# Patient Record
Sex: FEMALE | Race: WHITE | Employment: UNEMPLOYED | URBAN - METROPOLITAN AREA
[De-identification: names, ages, dates, MRNs, and addresses within clinical notes are randomized per-mention and may not be internally consistent; named-entity substitution may affect disease eponyms.]

---

## 2022-10-06 ENCOUNTER — APPOINTMENT (OUTPATIENT)
Dept: GENERAL RADIOLOGY | Age: 57
End: 2022-10-06
Payer: COMMERCIAL

## 2022-10-06 ENCOUNTER — HOSPITAL ENCOUNTER (EMERGENCY)
Age: 57
Discharge: HOME | End: 2022-10-06
Attending: EMERGENCY MEDICINE
Payer: COMMERCIAL

## 2022-10-06 VITALS
RESPIRATION RATE: 14 BRPM | HEART RATE: 100 BPM | HEIGHT: 60 IN | DIASTOLIC BLOOD PRESSURE: 96 MMHG | BODY MASS INDEX: 23.75 KG/M2 | SYSTOLIC BLOOD PRESSURE: 144 MMHG | WEIGHT: 121 LBS | OXYGEN SATURATION: 99 % | TEMPERATURE: 98.3 F

## 2022-10-06 DIAGNOSIS — S60.10XA SUBUNGUAL HEMATOMA OF FINGER OF LEFT HAND, INITIAL ENCOUNTER: ICD-10-CM

## 2022-10-06 DIAGNOSIS — S62.663A CLOSED NONDISPLACED FRACTURE OF DISTAL PHALANX OF LEFT MIDDLE FINGER, INITIAL ENCOUNTER: Primary | ICD-10-CM

## 2022-10-06 PROCEDURE — 11740 EVACUATION SUBUNGUAL HMTMA: CPT

## 2022-10-06 PROCEDURE — 73140 X-RAY EXAM OF FINGER(S): CPT

## 2022-10-06 PROCEDURE — 99283 EMERGENCY DEPT VISIT LOW MDM: CPT

## 2022-10-06 RX ORDER — CLINDAMYCIN HYDROCHLORIDE 300 MG/1
300 CAPSULE ORAL 3 TIMES DAILY
Qty: 30 CAPSULE | Refills: 0 | Status: SHIPPED | OUTPATIENT
Start: 2022-10-06 | End: 2022-10-16

## 2022-10-06 ASSESSMENT — PAIN SCALES - GENERAL: PAINLEVEL_OUTOF10: 6

## 2022-10-06 ASSESSMENT — PAIN DESCRIPTION - PAIN TYPE: TYPE: ACUTE PAIN

## 2022-10-06 ASSESSMENT — PAIN DESCRIPTION - ORIENTATION: ORIENTATION: LEFT

## 2022-10-06 ASSESSMENT — PAIN DESCRIPTION - LOCATION: LOCATION: HAND

## 2022-10-06 ASSESSMENT — PAIN - FUNCTIONAL ASSESSMENT: PAIN_FUNCTIONAL_ASSESSMENT: 0-10

## 2022-10-06 NOTE — ED PROVIDER NOTES
[facility] ENCOUNTER      Pt Name: Francisco Javier Byrd  MRN: [id number]  [place] 1965  Date of evaluation: 10/6/2022  Provider: Randy Mullen MD    [address] [place] [address]       Chief Complaint   Patient presents with    Hand Pain     Reports of hitting the left middle finger with a hammer on Saturday          HISTORY OF PRESENT ILLNESS   (Location/Symptom, Timing/Onset, Context/Setting, Quality, Duration, Modifying Factors, Severity)  Note limiting factors. Francisco Javier Byrd is a 64 y.o. female who presents to the emergency department 2 days after smashing her finger with a hammer    HPI    Nursing Notes were reviewed. This is a 51-year-old woman who comes to the emergency department 2 days after smashing her middle finger on her left hand with a hammer. The patient says that she was trying to hammer in a nail and missed and struck her middle finger. She has been having worsening pain and swelling in the finger as well as under the nailbed since that event. She denies any other injury. REVIEW OF SYSTEMS    (2-9 systems for level 4, 10 or more for level 5)     Review of Systems    8 systems reviewed with this patient and all were negative with the exception of those noted in the history of present illness above. PAST MEDICAL HISTORY   History reviewed. No pertinent past medical history. SURGICAL HISTORY     History reviewed. No pertinent surgical history. CURRENT MEDICATIONS       Discharge Medication List as of 10/6/2022  3:48 PM          ALLERGIES     Ciprofloxacin, Iodinated diagnostic agents, Levofloxacin, Azithromycin, Doxycycline, Methylprednisolone, Mometasone furo-formoterol fum, Cephalexin, Gabapentin, and Morphine    FAMILY HISTORY     History reviewed. No pertinent family history.        SOCIAL HISTORY       Social History     Socioeconomic History    Marital status: Single     Spouse name: None    Number of children: None Years of education: None    Highest education level: None   Tobacco Use    Smoking status: Every Day     Types: Cigarettes    Smokeless tobacco: Never   Substance and Sexual Activity    Alcohol use: Never    Drug use: Never       SCREENINGS         [place] Coma Scale  Eye Opening: Spontaneous  Best Verbal Response: Oriented  Best Motor Response: Obeys commands  [place] Coma Scale Score: 15                     CIWA Assessment  BP: (!) 144/96  Heart Rate: 100                 PHYSICAL EXAM    (up to 7 for level 4, 8 or more for level 5)     ED Triage Vitals   BP Temp Temp src Pulse Resp SpO2 Height Weight   -- -- -- -- -- -- -- --       Physical Exam  Vitals and nursing note reviewed. Constitutional:       General: She is not in acute distress. Appearance: Normal appearance. She is not ill-appearing, toxic-appearing or diaphoretic. HENT:      Head: Normocephalic and atraumatic. Nose: Nose normal.   Eyes:      Extraocular Movements: Extraocular movements intact. Cardiovascular:      Rate and Rhythm: Normal rate. Pulmonary:      Effort: Pulmonary effort is normal.   Musculoskeletal:        Hands:       Cervical back: Normal range of motion. Skin:     General: Skin is warm and dry. Capillary Refill: Capillary refill takes less than 2 seconds. Neurological:      General: No focal deficit present. Mental Status: She is alert and oriented to person, place, and time. Sensory: No sensory deficit.    Psychiatric:         Mood and Affect: Mood normal.         Behavior: Behavior normal.       DIAGNOSTIC RESULTS       RADIOLOGY:   Non-plain film images such as CT, Ultrasound and MRI are read by the radiologist. Plain radiographic images are visualized and preliminarily interpreted by the emergency physician with the below findings:    Interpretation per the Radiologist below, if available at the time of this note:    XR FINGER LEFT (MIN 2 VIEWS)   Final Result   Comminuted fracture of the tuft of the middle finger, which is likely an open   fracture given gas below the nail. ED BEDSIDE ULTRASOUND:   Performed by ED Physician - none    LABS:  Labs Reviewed - No data to display    All other labs were within normal range or not returned as of this dictation. EMERGENCY DEPARTMENT COURSE and DIFFERENTIAL DIAGNOSIS/MDM:   Vitals:    Vitals:    10/06/22 1436   BP: (!) 144/96   Pulse: 100   Resp: 14   Temp: 98.3 °F (36.8 °C)   TempSrc: Oral   SpO2: 99%   Weight: 121 lb (54.9 kg)   Height: 5' (1.524 m)       MDM  Signs and symptoms are consistent with subungual hematoma overlying a comminuted fracture of the distal phalanx. The nailbed was trephinated with a Bovie. This had significant release of subungual blood and fluid. The nailbed return to normal color afterwards. X-ray read by radiology indicates potential for comminuted open fracture due to the air under the nailbed, however this x-ray was performed after the trephination and the air into the nailbed likely represents the trephination and not an open fracture. Patient was provided with a aluminum foam pad for comfort. There is no evidence of ongoing infection in the finger. Signs and symptoms are consistent with the fracture and swelling. Patient is provided with antibiotics in the event that she develops erythema, purulent discharge, or signs of infection. She has been referred to her primary care physician as well as a hand specialist for follow-up. She is instructed to come back to the emerged part has any worsening symptoms including worsening pain, or fever. CONSULTS:  None    PROCEDURES:  Unless otherwise noted below, none     Procedures      FINAL IMPRESSION      1. Closed nondisplaced fracture of distal phalanx of left middle finger, initial encounter    2.  Subungual hematoma of finger of left hand, initial encounter          DISPOSITION/PLAN   DISPOSITION Decision To Discharge 10/06/2022 03:31:48 PM      PATIENT REFERRED TO:  No follow-up provider specified. DISCHARGE MEDICATIONS:  Discharge Medication List as of 10/6/2022  3:48 PM        START taking these medications    Details   clindamycin (CLEOCIN) 300 MG capsule Take 1 capsule by mouth 3 times daily for 10 days, Disp-30 capsule, R-0Print           Controlled Substances Monitoring:     No flowsheet data found.     (Please note that portions of this note were completed with a voice recognition program.  Efforts were made to edit the dictations but occasionally words are mis-transcribed.)    Ryley Sun MD (electronically signed)  Attending Emergency Physician            Ryley Sun MD  10/06/22 [id number]

## 2022-10-06 NOTE — Clinical Note
Phyllis Tena was seen and treated in our emergency department on 10/6/2022. She may return to work on 10/07/2022. If you have any questions or concerns, please don't hesitate to call.       Ryely Sun MD